# Patient Record
Sex: FEMALE | Race: AMERICAN INDIAN OR ALASKA NATIVE
[De-identification: names, ages, dates, MRNs, and addresses within clinical notes are randomized per-mention and may not be internally consistent; named-entity substitution may affect disease eponyms.]

---

## 2018-09-25 ENCOUNTER — HOSPITAL ENCOUNTER (INPATIENT)
Dept: HOSPITAL 14 - H.ER | Age: 38
LOS: 1 days | Discharge: HOME | DRG: 395 | End: 2018-09-26
Attending: FAMILY MEDICINE | Admitting: FAMILY MEDICINE
Payer: MEDICAID

## 2018-09-25 DIAGNOSIS — F12.90: ICD-10-CM

## 2018-09-25 DIAGNOSIS — I10: ICD-10-CM

## 2018-09-25 DIAGNOSIS — D62: Primary | ICD-10-CM

## 2018-09-25 DIAGNOSIS — N83.202: ICD-10-CM

## 2018-09-25 DIAGNOSIS — F17.210: ICD-10-CM

## 2018-09-25 DIAGNOSIS — N92.0: ICD-10-CM

## 2018-09-26 VITALS
TEMPERATURE: 98.1 F | DIASTOLIC BLOOD PRESSURE: 95 MMHG | RESPIRATION RATE: 18 BRPM | SYSTOLIC BLOOD PRESSURE: 152 MMHG | OXYGEN SATURATION: 98 % | HEART RATE: 57 BPM

## 2018-09-26 LAB
ALBUMIN SERPL-MCNC: 4.1 G/DL (ref 3.5–5)
ALBUMIN/GLOB SERPL: 1.2 {RATIO} (ref 1–2.1)
ALT SERPL-CCNC: 30 U/L (ref 9–52)
APTT BLD: 28.7 SECONDS (ref 25.6–37.1)
AST SERPL-CCNC: 29 U/L (ref 14–36)
BASOPHILS # BLD AUTO: 0.1 K/UL (ref 0–0.2)
BASOPHILS NFR BLD: 1.3 % (ref 0–2)
BUN SERPL-MCNC: 12 MG/DL (ref 7–17)
CALCIUM SERPL-MCNC: 8.8 MG/DL (ref 8.4–10.2)
EOSINOPHIL # BLD AUTO: 0.1 K/UL (ref 0–0.7)
EOSINOPHIL NFR BLD: 1 % (ref 0–4)
ERYTHROCYTE [DISTWIDTH] IN BLOOD BY AUTOMATED COUNT: 21.6 % (ref 11.5–14.5)
ERYTHROCYTE [DISTWIDTH] IN BLOOD BY AUTOMATED COUNT: 25.1 % (ref 11.5–14.5)
FSH SERPL-ACNC: 3 MIU/ML
GFR NON-AFRICAN AMERICAN: > 60
HGB BLD-MCNC: 7 G/DL (ref 12–16)
HGB BLD-MCNC: 8.9 G/DL (ref 12–16)
INR PPP: 1
LYMPHOCYTES # BLD AUTO: 3.2 K/UL (ref 1–4.3)
LYMPHOCYTES NFR BLD AUTO: 43 % (ref 20–40)
MCH RBC QN AUTO: 18.6 PG (ref 27–31)
MCH RBC QN AUTO: 20.5 PG (ref 27–31)
MCHC RBC AUTO-ENTMCNC: 30.5 G/DL (ref 33–37)
MCHC RBC AUTO-ENTMCNC: 31.9 G/DL (ref 33–37)
MCV RBC AUTO: 61 FL (ref 81–99)
MCV RBC AUTO: 64.2 FL (ref 81–99)
MONOCYTES # BLD: 0.6 K/UL (ref 0–0.8)
MONOCYTES NFR BLD: 7.5 % (ref 0–10)
NEUTROPHILS # BLD: 3.5 K/UL (ref 1.8–7)
NEUTROPHILS NFR BLD AUTO: 47.2 % (ref 50–75)
NRBC BLD AUTO-RTO: 0 % (ref 0–0)
PLATELET # BLD: 268 K/UL (ref 130–400)
PLATELET # BLD: 283 K/UL (ref 130–400)
PMV BLD AUTO: 8.4 FL (ref 7.2–11.7)
PROTHROMBIN TIME: 10.9 SECONDS (ref 9.8–13.1)
RBC # BLD AUTO: 3.78 MIL/UL (ref 3.8–5.2)
RBC # BLD AUTO: 4.35 MIL/UL (ref 3.8–5.2)
WBC # BLD AUTO: 7.4 K/UL (ref 4.8–10.8)
WBC # BLD AUTO: 7.5 K/UL (ref 4.8–10.8)

## 2018-09-26 PROCEDURE — 30233N1 TRANSFUSION OF NONAUTOLOGOUS RED BLOOD CELLS INTO PERIPHERAL VEIN, PERCUTANEOUS APPROACH: ICD-10-PCS | Performed by: FAMILY MEDICINE

## 2018-09-26 NOTE — US
Date of service: 



09/26/2018



HISTORY:

menorrhagia LMP 9/10/2018 



COMPARISON:

None available.



TECHNIQUE:

Transvaginal



FINDINGS:



UTERUS:

Measures 9.5 x 6.1 x 4.9 cm. Normal in size and appearance. No 

fibroid or other mass lesion seen.



ENDOMETRIUM:

Measures 9 mm in diameter. Unremarkable. 



CERVIX:

No cervical abnormality identified.



RIGHT OVARY:

Not visualized.  History of its prior surgical removal. 



LEFT OVARY:

Measures 7.1 x 7.2 x 4.5 cm. .  There is vascular flow present.  A 

2.5 x 2.5 x 2.0 cm simple benign-appearing cyst is noted.  A 

hypoechoic possibly complex cyst versus solid mass measuring 4.8 x 

5.0 x 4.5 cm is present. 



FREE FLUID:

No significant free fluid noted.



OTHER FINDINGS:

None. 



IMPRESSION:

Nonvisualized right ovary compatible with history of prior right 

nephrectomy



4.5 x 5.0 cm left adnexal/ovarian mass.  A complex cyst versus a 

solid mass are differential considerations.  For this, consider 

short-term follow-up rechecking in 4 to 6 weeks to assess for benign 

physiologic change.  



Adjacent to this is a simple benign-appearing simple cyst 

approximately 2 to 2.5 cm in size. 



Comments: Study marked for PA review . normal...

## 2018-09-26 NOTE — ED PDOC
HPI: Female  Pain


Time Seen by Provider: 09/25/18 23:48


Chief Complaint (Nursing): Female Genitourinary


Chief Complaint (Provider): vaginal bleeding


History Per: Patient


History/Exam Limitations: no limitations


Onset/Duration Of Symptoms: Days (5)


Current Symptoms Are (Timing): Still Present


Quality Of Discomfort: Cramping


Additional Complaint(s): 





39 y/o female presents for evaluation of irregular vaginal bleeding x 5 days.  

Patient states her menstrual period finished on 9/17, then a few days later she 

began bleeding again, passing large clots, and has not stopped.  Associated 

intermittent cramping.  Patient states she was seen at [facility] yesterday and 

was given Tylenol and had a neg preg test and was discharged. Patient presents 

today due to persistent bleeding and now feeling generalized weakness. Denies 

fever, nausea/vomiting, chest pain, shortness of breath, palpitations, changes 

in bowel movements, urinary symptoms. 


Abnormal Vaginal Bleeding: Yes


Last Menstral Period: 9/12/18





Past Medical History


Reviewed: Historical Data, Nursing Documentation, Vital Signs


Vital Signs: 





                                Last Vital Signs











Temp  98.2 F   09/25/18 23:36


 


Pulse  65   09/25/18 23:36


 


Resp  17   09/25/18 23:36


 


BP  132/75   09/25/18 23:36


 


Pulse Ox  99   09/25/18 23:36














- Medical History


PMH: No Chronic Diseases





- Family History


Family History: States: Unknown Family Hx





- Living Arrangements


Living Arrangements: With Family





- Immunization History


Hx Tetanus Toxoid Vaccination: No


Hx Influenza Vaccination: No


Hx Pneumococcal Vaccination: No





- Home Medications


Home Medications: 


                                Ambulatory Orders











 Medication  Instructions  Recorded


 


No Known Home Med  09/26/18














- Allergies


Allergies/Adverse Reactions: 


                                    Allergies











Allergy/AdvReac Type Severity Reaction Status Date / Time


 


No Known Allergies Allergy   Unverified 02/23/15 22:38














Review of Systems


ROS Statement: Except As Marked, All Systems Reviewed And Found Negative


Genitourinary Female: Positive for: Vaginal Bleeding





Physical Exam





- Reviewed


Nursing Documentation Reviewed: Yes


Vital Signs Reviewed: Yes





- Physical Exam


Appears: Positive for: Well, Non-toxic, No Acute Distress


Head Exam: Positive for: ATRAUMATIC, NORMAL INSPECTION, NORMOCEPHALIC


Skin: Positive for: Normal Color


Eye Exam: Positive for: Normal appearance


ENT: Positive for: Normal ENT Inspection


Cardiovascular/Chest: Positive for: Regular Rate, Rhythm


Respiratory: Positive for: Normal Breath Sounds


Gastrointestinal/Abdominal: Positive for: Normal Exam


Pelvic Exam: Positive for: External Exam Normal, Active Bleeding, Other (exam 

chaperone Kaylan Del Castillona ED tech)


Back: Positive for: Normal Inspection


Extremity: Positive for: Normal ROM


Neurologic/Psych: Positive for: Alert, Oriented (x3)





- Laboratory Results


Result Diagrams: 


                                 09/26/18 00:38





                                 09/26/18 00:38





- ECG


O2 Sat by Pulse Oximetry: 99





- Progress


ED Course And Treament: 


labs, urine, IV fluids





Hgb 7.0


Patient still actively bleeding


Consent obtained for blood transfusion


2 units PRBCs ordered


Case discussed with Dr. Naranjo, Ob/Gyn on-call, will consult on floor


Case discussed with Dr. Vaca, medical service on-call, for admission











Disposition





- Clinical Impression


Clinical Impression: 


 Anemia, Vaginal bleeding








- Patient ED Disposition


Is Patient to be Admitted: No





- Disposition


Disposition Time: 03:00


Condition: FAIR

## 2018-09-26 NOTE — CP.PCM.PCO
Assessment & Plan





- Assessment and Plan (Free Text)


Assessment: 


 


Pt is a 37 y/o female who presented to ED with complaints of heavy menstrual 

bleeds admitted for severe symptomatic anemia, Hg 7.





--TVUS reviewed: Left Adnexal Complex Cyst ~4-5cm w/ multiple smaller cyst


--Post transfusion Hg 8.9, bleeding improved


---GYN consult: spoke with OBGYN resident, clear for discharge, advised to f/u 

with GYN outpatient, pt declined hormonal therapy











Discussed Case with Dr. Vaca

## 2018-09-26 NOTE — CP.PCM.CON
History of Present Illness





- History of Present Illness


History of Present Illness: 








Consult note for OBGYN:





37 y/o female  w/ c/o heavy vaginal bleeding. She states that her period 

began on 9/10 and ended on . She started to bleed again on  w/ large 

clots, abdominal cramping, and back pain. Patient states that she used 2-3 large

sanitary pads/day. She reports being seen in [place] ER and being d/c w/o 

intervention. She then came to [facility] ER for evaluation and was found to have a 

hgb of 7. Patient denies previous hx of heavy menstruation. Patient denies OCP 

use. Patient denies n/v/dizziness/chest pain/hot flashes.





Pmd: none


Pmhx: HTN


OBGYN: menarche age 10. Regular menstrual cycle, every 28 days, lasts 5-7 days. 

. NSVDx2. Hx of Chlamydia. Hx of right ovarian cyst/removal ().


SurgHx: Ovarian cyst removal


FamHx: Maternal grandmother has HTN. Denies Famhx of uterine cancer


SocialHx: Smokes 5 cigarettes/day x 20 yrs. Denies etoh use. Admits to marijuana

use. 


HomeRx: none


Allergies: NKDA





ER course


-CBC: hgb 7


-IVF hydration 1 L NS bolus


-2 PRBC units ordered for transfusion 











Review of Systems





- Constitutional


Constitutional: absent: Fatigue, Malaise, Weakness





- EENT


Nose/Mouth/Throat: absent: Epistaxis





- Cardiovascular


Cardiovascular: absent: Chest Pain





- Genitourinary


Genitourinary: absent: Dysuria





- Reproductive: Female


Reproductive:Female: Heavy Menses, Abnormal Vaginal Bleeding, Pelvic Pain





- Endocrine


Endocrine: absent: Cold Intolorance, Fatigue, Flushing, Heat Intolorance





Past Patient History





- Past Medical History & Family History


Past Medical History?: Yes





- Past Social History


Smoking Status: Light Smoker < 10 Cigarettes Daily





- CARDIAC


Hx Cardiac Disorders: No





- PULMONARY


Hx Respiratory Disorders: No





- NEUROLOGICAL


Hx Neurological Disorder: No





- HEENT


Hx HEENT Problems: No





- RENAL


Hx Chronic Kidney Disease: No





- ENDOCRINE/METABOLIC


Hx Endocrine Disorders: No





- HEMATOLOGICAL/ONCOLOGICAL


Hx Blood Disorders: No





- INTEGUMENTARY


Hx Dermatological Problems: No





- MUSCULOSKELETAL/RHEUMATOLOGICAL


Hx Musculoskeletal Disorders: No


Hx Falls: No





- GASTROINTESTINAL


Hx Gastrointestinal Disorders: No





- GENITOURINARY/GYNECOLOGICAL


Hx Genitourinary Disorders: No





- PSYCHIATRIC


Hx Psychophysiologic Disorder: No


Hx Substance Use: No





- SURGICAL HISTORY


Hx Surgeries: Yes


Other/Comment: hx or right ovary removal, right hand cyst removal





- ANESTHESIA


Hx Anesthesia: Yes


Hx Anesthesia Reactions: No


Hx Malignant Hyperthermia: No





Meds


Home Medications: 


                              Home Medication List











 Medication  Instructions  Recorded  Confirmed  Type


 


Ferrous Sulfate [Ferosul] 325 mg PO BID #60 tablet 18  Rx











Allergies/Adverse Reactions: 


                                    Allergies











Allergy/AdvReac Type Severity Reaction Status Date / Time


 


No Known Allergies Allergy   Unverified 02/23/15 22:38














- Medications


Medications: 


                               Current Medications





Acetaminophen (Tylenol 325mg Tab)  650 mg PO Q6 PRN


   PRN Reason: Pain, moderate (4-7)


   Last Admin: 18 16:40 Dose:  650 mg


Hydromorphone HCl (Dilaudid)  1 mg IVP Q4 PRN


   PRN Reason: Pain, severe (8-10)


Pantoprazole Sodium (Protonix Inj)  40 mg IVP DAILY DANY


   Last Admin: 18 09:16 Dose:  40 mg











Physical Exam





- Constitutional


Appears: No Acute Distress, Younger Than Stated Age





- Eye Exam


Eye Exam: Normal appearance





- ENT Exam


ENT Exam: Mucous Membranes Moist





- Respiratory Exam


Respiratory Exam: Clear to Auscultation Bilateral, NORMAL BREATHING PATTERN





- Cardiovascular Exam


Cardiovascular Exam: REGULAR RHYTHM, +S1, +S2





- GI/Abdominal Exam


GI & Abdominal Exam: Normal Bowel Sounds, Soft.  absent: Tenderness





- Extremities Exam


Extremities exam: Positive for: normal inspection.  Negative for: calf 

tenderness, pedal edema





- Neurological Exam


Neurological exam: Alert, Oriented x3





- Psychiatric Exam


Psychiatric exam: Normal Affect





- Skin


Skin Exam: Dry, Intact, Warm





Results





- Vital Signs


Recent Vital Signs: 


                                Last Vital Signs











Temp  98.1 F   18 15:55


 


Pulse  57 L  18 15:55


 


Resp  18   18 15:55


 


BP  152/95 H  18 15:55


 


Pulse Ox  98   18 15:55














- Labs


Result Diagrams: 


                                 18 15:15





                                 18 00:38


Labs: 


                         Laboratory Results - last 24 hr











  18





  00:38 00:38 01:47


 


WBC  7.5  


 


RBC  3.78 L  


 


Hgb  7.0 L  


 


Hct  23.0 L  


 


MCV  61.0 L  


 


MCH  18.6 L  


 


MCHC  30.5 L  


 


RDW  21.6 H  


 


Plt Count  283  


 


MPV  8.4  


 


Neut % (Auto)  47.2 L  


 


Lymph % (Auto)  43.0 H  


 


Mono % (Auto)  7.5  


 


Eos % (Auto)  1.0  


 


Baso % (Auto)  1.3  


 


Neut # (Auto)  3.5  


 


Lymph # (Auto)  3.2  


 


Mono # (Auto)  0.6  


 


Eos # (Auto)  0.1  


 


Baso # (Auto)  0.1  


 


PT   


 


INR   


 


APTT   


 


Sodium   136 


 


Potassium   3.7 


 


Chloride   105 


 


Carbon Dioxide   23 


 


Anion Gap   12 


 


BUN   12 


 


Creatinine   0.8 


 


Est GFR ( Amer)   > 60 


 


Est GFR (Non-Af Amer)   > 60 


 


Random Glucose   101 


 


Calcium   8.8 


 


Total Bilirubin   0.1 L 


 


AST   29 


 


ALT   30 


 


Alkaline Phosphatase   60 


 


Total Protein   7.5 


 


Albumin   4.1 


 


Globulin   3.4 


 


Albumin/Globulin Ratio   1.2 


 


TSH 3rd Generation   


 


FSH 3rd Generation   


 


Luteinizing Hormone   


 


Blood Type    A NEGATIVE


 


Antibody Screen    Negative


 


Crossmatch    See Detail


 


BBK History Checked    Patient has bt














  18





  15:15 15:15 15:15


 


WBC   


 


RBC   


 


Hgb   


 


Hct   


 


MCV   


 


MCH   


 


MCHC   


 


RDW   


 


Plt Count   


 


MPV   


 


Neut % (Auto)   


 


Lymph % (Auto)   


 


Mono % (Auto)   


 


Eos % (Auto)   


 


Baso % (Auto)   


 


Neut # (Auto)   


 


Lymph # (Auto)   


 


Mono # (Auto)   


 


Eos # (Auto)   


 


Baso # (Auto)   


 


PT  10.9  


 


INR  1.0  


 


APTT  28.7  


 


Sodium   


 


Potassium   


 


Chloride   


 


Carbon Dioxide   


 


Anion Gap   


 


BUN   


 


Creatinine   


 


Est GFR ( Amer)   


 


Est GFR (Non-Af Amer)   


 


Random Glucose   


 


Calcium   


 


Total Bilirubin   


 


AST   


 


ALT   


 


Alkaline Phosphatase   


 


Total Protein   


 


Albumin   


 


Globulin   


 


Albumin/Globulin Ratio   


 


TSH 3rd Generation   0.79 


 


FSH 3rd Generation   3.0 


 


Luteinizing Hormone    2.3


 


Blood Type   


 


Antibody Screen   


 


Crossmatch   


 


BBK History Checked   














  18





  15:15


 


WBC  7.4


 


RBC  4.35


 


Hgb  8.9 L


 


Hct  27.9 L


 


MCV  64.2 L D


 


MCH  20.5 L


 


MCHC  31.9 L


 


RDW  25.1 H


 


Plt Count  268


 


MPV 


 


Neut % (Auto) 


 


Lymph % (Auto) 


 


Mono % (Auto) 


 


Eos % (Auto) 


 


Baso % (Auto) 


 


Neut # (Auto) 


 


Lymph # (Auto) 


 


Mono # (Auto) 


 


Eos # (Auto) 


 


Baso # (Auto) 


 


PT 


 


INR 


 


APTT 


 


Sodium 


 


Potassium 


 


Chloride 


 


Carbon Dioxide 


 


Anion Gap 


 


BUN 


 


Creatinine 


 


Est GFR ( Amer) 


 


Est GFR (Non-Af Amer) 


 


Random Glucose 


 


Calcium 


 


Total Bilirubin 


 


AST 


 


ALT 


 


Alkaline Phosphatase 


 


Total Protein 


 


Albumin 


 


Globulin 


 


Albumin/Globulin Ratio 


 


TSH 3rd Generation 


 


FSH 3rd Generation 


 


Luteinizing Hormone 


 


Blood Type 


 


Antibody Screen 


 


Crossmatch 


 


BBK History Checked 














Assessment & Plan





- Assessment and Plan (Free Text)


Assessment: 





37 y/o female w/ hx of HTN c/o of heavy vaginal bleeding persisting for 1 week.





Plan:


Anemia 2/2 to menorrhagia 


-Hgb 7.0 --> 8.9 s/p 2 units of PRBC transfusion


-Coag studies, TSH, FSH, LH wnl


-Pregnancy test negative in ER


-Transvaginal ultrasound showed no fibroids, normal uterus in size and 

appearance. Endometrium measuring 9mm in diameter. Small left ovarian simple 

cyst noted + hypoechoic complex cyst vs. solid mass measuring 4.8x5.0x5.4cm. No 

free fluid noted.


-Patient is not interested in OCPs/any hormonal treatment


-Patient advised to start Ferous Sulfate 325mg BID


-Patient states that she feels better and would like to go home


-Patient instructed and informed of importance of outpatient follow up w/ OBGYN 

within 1 week. She states she will make an appointment w/ a private doctor. 


-Patient aware that she will need follow up in 4-6 weeks to reassess left 

ovarian cyst


-Patient advised of ER precautions


-Case discussed w/ Dr. Vaca and OB attending physician








- Date & Time


Date: 18


Time: 17:23

## 2018-09-26 NOTE — CP.PCM.HP
History of Present Illness





- History of Present Illness


History of Present Illness: 


 Pt is a 37 y/o female who presented to ED with complaints of heavy menstrual 

bleeds associated with feeling lightheaded and sob. State her menstrual period 

began on the 9/10 and has persisted. Reports changing her pad every 1-2 hours w

ith multiple clots nad moderate cramping. Denies hx of menstrual bleeds but 

states she did have an ovarian cyst surgically removed in . States menstrual

cycle is usually regular with moderate bleeding or 4-5 days. Has appt with OBGYN

on 10/01/18.





GYN Hx: Last pap smear 2-3 years ago, NILM, HPV negative. Denies Hx of STD's. Hx

of R/ Ovarian Cystectomy





OB Hx: 2 , no complications (18, and 5 years ago)





PMHX: HTN





Medications: Denies





NDKA





ED Course:


-1L Bolus


-Hemoglobin 7.0


-Type & Cross PRBC x1


-TVUS


-GYN Consulted








Present on Admission





- Present on Admission


Any Indicators Present on Admission: No





Past Patient History





- Past Medical History & Family History


Past Medical History?: Yes





- Past Social History


Smoking Status: Light Smoker < 10 Cigarettes Daily





- CARDIAC


Hx Cardiac Disorders: No





- PULMONARY


Hx Respiratory Disorders: No





- NEUROLOGICAL


Hx Neurological Disorder: No





- HEENT


Hx HEENT Problems: No





- RENAL


Hx Chronic Kidney Disease: No





- ENDOCRINE/METABOLIC


Hx Endocrine Disorders: No





- HEMATOLOGICAL/ONCOLOGICAL


Hx Blood Disorders: No





- INTEGUMENTARY


Hx Dermatological Problems: No





- MUSCULOSKELETAL/RHEUMATOLOGICAL


Hx Musculoskeletal Disorders: No


Hx Falls: No





- GASTROINTESTINAL


Hx Gastrointestinal Disorders: No





- GENITOURINARY/GYNECOLOGICAL


Hx Genitourinary Disorders: No





- PSYCHIATRIC


Hx Psychophysiologic Disorder: No


Hx Substance Use: No





- SURGICAL HISTORY


Hx Surgeries: Yes


Other/Comment: hx or right ovary removal, right hand cyst removal





- ANESTHESIA


Hx Anesthesia: Yes


Hx Anesthesia Reactions: No


Hx Malignant Hyperthermia: No





Meds


Home Medications: 


                              Home Medication List











 Medication  Instructions  Recorded  Confirmed  Type


 


Ferrous Sulfate [Ferosul] 325 mg PO BID #60 tablet 18  Rx











Allergies/Adverse Reactions: 


                                    Allergies











Allergy/AdvReac Type Severity Reaction Status Date / Time


 


No Known Allergies Allergy   Unverified 02/23/15 22:38














Physical Exam





- Constitutional


Appears: Well, Non-toxic, No Acute Distress





- Head Exam


Head Exam: NORMAL INSPECTION





- Eye Exam


Eye Exam: Normal appearance





- ENT Exam


ENT Exam: Mucous Membranes Moist





- Neck Exam


Neck exam: Positive for: Full Rom





- Respiratory Exam


Respiratory Exam: Clear to Auscultation Bilateral





- Cardiovascular Exam


Cardiovascular Exam: REGULAR RHYTHM





- GI/Abdominal Exam


GI & Abdominal Exam: Normal Bowel Sounds, Soft





- Extremities Exam


Extremities exam: Positive for: normal inspection





- Neurological Exam


Neurological exam: Alert, Oriented x3





- Psychiatric Exam


Psychiatric exam: Normal Mood





- Skin


Skin Exam: Normal Color





Results





- Vital Signs


Recent Vital Signs: 





                                Last Vital Signs











Temp  98.1 F   18 15:55


 


Pulse  57 L  18 15:55


 


Resp  18   18 15:55


 


BP  152/95 H  18 15:55


 


Pulse Ox  98   18 15:55














- Labs


Result Diagrams: 


                                 18 15:15





                                 18 00:38


Labs: 





                         Laboratory Results - last 24 hr











  18





  00:38 00:38 01:47


 


WBC  7.5  


 


RBC  3.78 L  


 


Hgb  7.0 L  


 


Hct  23.0 L  


 


MCV  61.0 L  


 


MCH  18.6 L  


 


MCHC  30.5 L  


 


RDW  21.6 H  


 


Plt Count  283  


 


MPV  8.4  


 


Neut % (Auto)  47.2 L  


 


Lymph % (Auto)  43.0 H  


 


Mono % (Auto)  7.5  


 


Eos % (Auto)  1.0  


 


Baso % (Auto)  1.3  


 


Neut # (Auto)  3.5  


 


Lymph # (Auto)  3.2  


 


Mono # (Auto)  0.6  


 


Eos # (Auto)  0.1  


 


Baso # (Auto)  0.1  


 


PT   


 


INR   


 


APTT   


 


Sodium   136 


 


Potassium   3.7 


 


Chloride   105 


 


Carbon Dioxide   23 


 


Anion Gap   12 


 


BUN   12 


 


Creatinine   0.8 


 


Est GFR ( Amer)   > 60 


 


Est GFR (Non-Af Amer)   > 60 


 


Random Glucose   101 


 


Calcium   8.8 


 


Total Bilirubin   0.1 L 


 


AST   29 


 


ALT   30 


 


Alkaline Phosphatase   60 


 


Total Protein   7.5 


 


Albumin   4.1 


 


Globulin   3.4 


 


Albumin/Globulin Ratio   1.2 


 


TSH 3rd Generation   


 


FSH 3rd Generation   


 


Luteinizing Hormone   


 


Blood Type    A NEGATIVE


 


Antibody Screen    Negative


 


Crossmatch    See Detail


 


BBK History Checked    Patient has bt














  18





  15:15 15:15 15:15


 


WBC   


 


RBC   


 


Hgb   


 


Hct   


 


MCV   


 


MCH   


 


MCHC   


 


RDW   


 


Plt Count   


 


MPV   


 


Neut % (Auto)   


 


Lymph % (Auto)   


 


Mono % (Auto)   


 


Eos % (Auto)   


 


Baso % (Auto)   


 


Neut # (Auto)   


 


Lymph # (Auto)   


 


Mono # (Auto)   


 


Eos # (Auto)   


 


Baso # (Auto)   


 


PT  10.9  


 


INR  1.0  


 


APTT  28.7  


 


Sodium   


 


Potassium   


 


Chloride   


 


Carbon Dioxide   


 


Anion Gap   


 


BUN   


 


Creatinine   


 


Est GFR ( Amer)   


 


Est GFR (Non-Af Amer)   


 


Random Glucose   


 


Calcium   


 


Total Bilirubin   


 


AST   


 


ALT   


 


Alkaline Phosphatase   


 


Total Protein   


 


Albumin   


 


Globulin   


 


Albumin/Globulin Ratio   


 


TSH 3rd Generation   0.79 


 


FSH 3rd Generation   3.0 


 


Luteinizing Hormone    2.3


 


Blood Type   


 


Antibody Screen   


 


Crossmatch   


 


BBK History Checked   














  18





  15:15


 


WBC  7.4


 


RBC  4.35


 


Hgb  8.9 L


 


Hct  27.9 L


 


MCV  64.2 L D


 


MCH  20.5 L


 


MCHC  31.9 L


 


RDW  25.1 H


 


Plt Count  268


 


MPV 


 


Neut % (Auto) 


 


Lymph % (Auto) 


 


Mono % (Auto) 


 


Eos % (Auto) 


 


Baso % (Auto) 


 


Neut # (Auto) 


 


Lymph # (Auto) 


 


Mono # (Auto) 


 


Eos # (Auto) 


 


Baso # (Auto) 


 


PT 


 


INR 


 


APTT 


 


Sodium 


 


Potassium 


 


Chloride 


 


Carbon Dioxide 


 


Anion Gap 


 


BUN 


 


Creatinine 


 


Est GFR ( Amer) 


 


Est GFR (Non-Af Amer) 


 


Random Glucose 


 


Calcium 


 


Total Bilirubin 


 


AST 


 


ALT 


 


Alkaline Phosphatase 


 


Total Protein 


 


Albumin 


 


Globulin 


 


Albumin/Globulin Ratio 


 


TSH 3rd Generation 


 


FSH 3rd Generation 


 


Luteinizing Hormone 


 


Blood Type 


 


Antibody Screen 


 


Crossmatch 


 


BBK History Checked 














Assessment & Plan





- Assessment and Plan (Free Text)


Assessment: 


Pt is a 37 y/o female who presented to ED with complaints of heavy menstrual 

bleeds admitted for severe symptomatic anemia, Hg 7. 





#Anemia, acute


-Hemodynamically stable


-s/p transfusion 1 unit PRBC


-Hg improved 8.9 post transfusion





#Menorrhagia


-TVUS


-GYN Consulted








Discussed case with Dr. Lio Bustamante, PGY2

## 2018-10-01 NOTE — PQF
PROVIDER RESPONSE TEXT:



Anemia is due to acute blood loss, Menorrhagia.



REVIEWER QUERY TEXT:



Anemia Type



Anemia is documented in the Medical Record.  Please specify the cause (includes suspected or probable
 cause)

Such as:

-- Due to acute blood loss

-- Due to chronic blood loss

-- Due to iron deficiency

-- Due to chronic disease

-- Other, please specify



The patient's Clinical Indicators include:

Patient presents with heavy menstrual bleeding , feeling lightheaded and SOB.







DX: Acute Anemia, Menorrhagia



RX: PRBC, IVF







Query created by: Tanya Kaba on 10/1/2018 7:57 AM





Electronically signed by:  Griselda Bustamante 10/1/2018 11:13 AM